# Patient Record
Sex: MALE | Race: WHITE | NOT HISPANIC OR LATINO | Employment: FULL TIME | URBAN - METROPOLITAN AREA
[De-identification: names, ages, dates, MRNs, and addresses within clinical notes are randomized per-mention and may not be internally consistent; named-entity substitution may affect disease eponyms.]

---

## 2018-12-03 ENCOUNTER — OFFICE VISIT - HEALTHEAST (OUTPATIENT)
Dept: UROLOGY | Facility: CLINIC | Age: 58
End: 2018-12-03

## 2018-12-03 DIAGNOSIS — R31.0 GROSS HEMATURIA: ICD-10-CM

## 2018-12-03 DIAGNOSIS — N20.0 CALCULUS OF KIDNEY: ICD-10-CM

## 2018-12-03 DIAGNOSIS — R10.9 LEFT SIDED ABDOMINAL PAIN: ICD-10-CM

## 2018-12-03 LAB
ALBUMIN UR-MCNC: ABNORMAL MG/DL
APPEARANCE UR: ABNORMAL
BILIRUB UR QL STRIP: ABNORMAL
COLOR UR AUTO: ABNORMAL
GLUCOSE UR STRIP-MCNC: NEGATIVE MG/DL
HGB UR QL STRIP: ABNORMAL
KETONES UR STRIP-MCNC: NEGATIVE MG/DL
LEUKOCYTE ESTERASE UR QL STRIP: NEGATIVE
NITRATE UR QL: NEGATIVE
PH UR STRIP: 5 [PH] (ref 5–8)
SP GR UR STRIP: 1.02 (ref 1–1.03)
UROBILINOGEN UR STRIP-ACNC: ABNORMAL

## 2018-12-03 ASSESSMENT — MIFFLIN-ST. JEOR: SCORE: 1400.72

## 2018-12-07 ENCOUNTER — ANESTHESIA - HEALTHEAST (OUTPATIENT)
Dept: SURGERY | Facility: CLINIC | Age: 58
End: 2018-12-07

## 2018-12-07 ENCOUNTER — SURGERY - HEALTHEAST (OUTPATIENT)
Dept: SURGERY | Facility: CLINIC | Age: 58
End: 2018-12-07

## 2018-12-07 ASSESSMENT — MIFFLIN-ST. JEOR: SCORE: 1370.1

## 2018-12-13 ENCOUNTER — AMBULATORY - HEALTHEAST (OUTPATIENT)
Dept: UROLOGY | Facility: CLINIC | Age: 58
End: 2018-12-13

## 2018-12-13 DIAGNOSIS — N20.0 CALCULUS OF KIDNEY: ICD-10-CM

## 2018-12-13 LAB
ALBUMIN UR-MCNC: ABNORMAL MG/DL
APPEARANCE UR: ABNORMAL
BILIRUB UR QL STRIP: ABNORMAL
COLOR UR AUTO: ABNORMAL
GLUCOSE UR STRIP-MCNC: NEGATIVE MG/DL
HGB UR QL STRIP: ABNORMAL
KETONES UR STRIP-MCNC: NEGATIVE MG/DL
LEUKOCYTE ESTERASE UR QL STRIP: ABNORMAL
NITRATE UR QL: NEGATIVE
PH UR STRIP: 5.5 [PH] (ref 5–8)
SP GR UR STRIP: 1.02 (ref 1–1.03)
UROBILINOGEN UR STRIP-ACNC: ABNORMAL

## 2018-12-14 LAB — BACTERIA SPEC CULT: NO GROWTH

## 2019-01-21 ENCOUNTER — OFFICE VISIT - HEALTHEAST (OUTPATIENT)
Dept: UROLOGY | Facility: CLINIC | Age: 59
End: 2019-01-21

## 2019-01-21 ENCOUNTER — HOSPITAL ENCOUNTER (OUTPATIENT)
Dept: CT IMAGING | Facility: CLINIC | Age: 59
Discharge: HOME | End: 2019-01-21
Attending: UROLOGY

## 2019-01-21 DIAGNOSIS — N20.1 CALCULUS OF URETER: ICD-10-CM

## 2019-01-21 DIAGNOSIS — N20.0 CALCULUS OF KIDNEY: ICD-10-CM

## 2019-01-21 LAB
ALBUMIN UR-MCNC: ABNORMAL MG/DL
APPEARANCE UR: ABNORMAL
BILIRUB UR QL STRIP: ABNORMAL
COLOR UR AUTO: ABNORMAL
GLUCOSE UR STRIP-MCNC: NEGATIVE MG/DL
HGB UR QL STRIP: ABNORMAL
KETONES UR STRIP-MCNC: ABNORMAL MG/DL
LEUKOCYTE ESTERASE UR QL STRIP: NEGATIVE
NITRATE UR QL: NEGATIVE
PH UR STRIP: 5.5 [PH] (ref 5–8)
SP GR UR STRIP: 1.02 (ref 1–1.03)
UROBILINOGEN UR STRIP-ACNC: ABNORMAL

## 2019-02-25 ENCOUNTER — COMMUNICATION - HEALTHEAST (OUTPATIENT)
Dept: UROLOGY | Facility: CLINIC | Age: 59
End: 2019-02-25

## 2019-02-25 ENCOUNTER — AMBULATORY - HEALTHEAST (OUTPATIENT)
Dept: LAB | Facility: CLINIC | Age: 59
End: 2019-02-25

## 2019-02-25 DIAGNOSIS — N20.0 CALCULUS OF KIDNEY: ICD-10-CM

## 2019-02-25 LAB
ALBUMIN SERPL-MCNC: 3.9 G/DL (ref 3.5–5)
ANION GAP SERPL CALCULATED.3IONS-SCNC: 6 MMOL/L (ref 5–18)
BUN SERPL-MCNC: 17 MG/DL (ref 8–22)
CALCIUM SERPL-MCNC: 9.1 MG/DL (ref 8.5–10.5)
CALCIUM SERPL-MCNC: 9.1 MG/DL (ref 8.5–10.5)
CALCIUM, IONIZED MEASURED: 1.21 MMOL/L (ref 1.11–1.3)
CHLORIDE BLD-SCNC: 106 MMOL/L (ref 98–107)
CO2 SERPL-SCNC: 28 MMOL/L (ref 22–31)
CREAT SERPL-MCNC: 0.98 MG/DL (ref 0.7–1.3)
CREAT SERPL-MCNC: 1.05 MG/DL (ref 0.7–1.3)
GFR SERPL CREATININE-BSD FRML MDRD: >60 ML/MIN/1.73M2
GFR SERPL CREATININE-BSD FRML MDRD: >60 ML/MIN/1.73M2
GLUCOSE BLD-MCNC: 88 MG/DL (ref 70–125)
ION CA PH 7.4: 1.18 MMOL/L (ref 1.11–1.3)
MAGNESIUM SERPL-MCNC: 2.4 MG/DL (ref 1.8–2.6)
PH: 7.34 (ref 7.35–7.45)
PHOSPHATE SERPL-MCNC: 3.1 MG/DL (ref 2.5–4.5)
PHOSPHATE SERPL-MCNC: 3.1 MG/DL (ref 2.5–4.5)
POTASSIUM BLD-SCNC: 4.1 MMOL/L (ref 3.5–5)
PTH-INTACT SERPL-MCNC: 107 PG/ML (ref 10–86)
SODIUM SERPL-SCNC: 140 MMOL/L (ref 136–145)
URATE SERPL-MCNC: 5 MG/DL (ref 3–8)

## 2019-02-26 LAB
COLLECT DURATION TIME SPEC: 24 HR
CREAT 24H UR-MRATE: 1343 MG/D (ref 800–2100)
CREAT UR-MCNC: 158 MG/DL
MAGNESIUM 24H UR-MRATE: 83 MG/D (ref 12–199)
MAGNESIUM UR-MCNC: 9.8 MG/DL
SPECIMEN VOL ?TM UR: 850 ML

## 2019-02-28 LAB
CALCIUM 24H UR-MRATE: 162 MG/24HR (ref 25–300)
CHLORIDE 24H UR-SRATE: 172 MMOL/24HR (ref 110–250)
CITRATE 24H UR-MCNC: 412 MG/24HR
CREATININE, 24 HR URINE - HISTORICAL: ABNORMAL MG/24HR
MAGNESIUM 24H UR-MRATE: ABNORMAL MG/24 HR (ref 75–150)
OXALATE MG/SPEC: 17.2 MG/24HR (ref 7–44)
PH UR STRIP: 5.5 [PH] (ref 4.5–8)
PHOSPHORUS URINE MG/SPEC: 736.1 MG/24HR
POTASSIUM 24H UR-SCNC: 42 MMOL/24HR (ref 30–90)
SODIUM 24H UR-SRATE: 162 MMOL/24HR (ref 40–217)
SPECIMEN VOL UR: 850 ML
URIC ACID URINE MG/SPEC: 367 MG/24HR (ref 250–750)

## 2019-03-08 ENCOUNTER — AMBULATORY - HEALTHEAST (OUTPATIENT)
Dept: UROLOGY | Facility: CLINIC | Age: 59
End: 2019-03-08

## 2019-03-08 DIAGNOSIS — N20.0 CALCULUS OF KIDNEY: ICD-10-CM

## 2019-03-11 ENCOUNTER — AMBULATORY - HEALTHEAST (OUTPATIENT)
Dept: LAB | Facility: HOSPITAL | Age: 59
End: 2019-03-11

## 2019-03-11 ENCOUNTER — HOSPITAL ENCOUNTER (OUTPATIENT)
Dept: PHYSICAL MEDICINE AND REHAB | Facility: CLINIC | Age: 59
Discharge: HOME | End: 2019-03-11
Attending: NURSE PRACTITIONER

## 2019-03-11 DIAGNOSIS — M43.10 PARS DEFECT WITH SPONDYLOLISTHESIS: ICD-10-CM

## 2019-03-11 DIAGNOSIS — M43.16 SPONDYLOLISTHESIS OF LUMBAR REGION: ICD-10-CM

## 2019-03-11 DIAGNOSIS — N20.0 CALCULUS OF KIDNEY: ICD-10-CM

## 2019-03-12 LAB
CALCIUM 24H UR-MRATE: 106 MG/24HR (ref 25–300)
CHLORIDE 24H UR-SRATE: 94 MMOL/24HR (ref 110–250)
CITRATE 24H UR-MCNC: 302 MG/24HR
COLLECT DURATION TIME SPEC: 24 HR
CREAT 24H UR-MRATE: 1155 MG/D (ref 800–2100)
CREAT UR-MCNC: 210 MG/DL
CREATININE, 24 HR URINE - HISTORICAL: ABNORMAL MG/24HR
MAGNESIUM 24H UR-MRATE: 57 MG/D (ref 12–199)
MAGNESIUM 24H UR-MRATE: ABNORMAL MG/24 HR (ref 75–150)
MAGNESIUM UR-MCNC: 10.3 MG/DL
OXALATE MG/SPEC: 18 MG/24HR (ref 7–44)
PH UR STRIP: 5.5 [PH] (ref 4.5–8)
PHOSPHORUS URINE MG/SPEC: 725.5 MG/24HR
POTASSIUM 24H UR-SCNC: 29 MMOL/24HR (ref 30–90)
SODIUM 24H UR-SRATE: 93 MMOL/24HR (ref 40–217)
SPECIMEN VOL ?TM UR: 550 ML
SPECIMEN VOL UR: 550 ML
URIC ACID URINE MG/SPEC: 262 MG/24HR (ref 250–750)

## 2019-03-18 ENCOUNTER — COMMUNICATION - HEALTHEAST (OUTPATIENT)
Dept: UROLOGY | Facility: CLINIC | Age: 59
End: 2019-03-18

## 2019-03-25 ENCOUNTER — OFFICE VISIT - HEALTHEAST (OUTPATIENT)
Dept: UROLOGY | Facility: CLINIC | Age: 59
End: 2019-03-25

## 2019-03-25 DIAGNOSIS — N20.0 CALCULUS OF KIDNEY: ICD-10-CM

## 2019-03-25 DIAGNOSIS — Z87.442 HISTORY OF KIDNEY STONES: ICD-10-CM

## 2019-03-25 LAB
ALBUMIN UR-MCNC: ABNORMAL MG/DL
APPEARANCE UR: ABNORMAL
BILIRUB UR QL STRIP: ABNORMAL
COLOR UR AUTO: ABNORMAL
GLUCOSE UR STRIP-MCNC: NEGATIVE MG/DL
HGB UR QL STRIP: ABNORMAL
KETONES UR STRIP-MCNC: NEGATIVE MG/DL
LEUKOCYTE ESTERASE UR QL STRIP: NEGATIVE
NITRATE UR QL: NEGATIVE
PH UR STRIP: 5.5 [PH] (ref 5–8)
SP GR UR STRIP: >=1.03 (ref 1–1.03)
UROBILINOGEN UR STRIP-ACNC: ABNORMAL

## 2021-05-27 NOTE — PROGRESS NOTES
Assessment/Plan:        Diagnoses and all orders for this visit:    Calculus of kidney  -     Patient Increasing Fluids Education  -     Patient Stated Goal: Prevent further stones  -     CT Abdomen Pelvis Without Oral Without IV Contrast; Future; Expected date: 12/24/2019    History of kidney stones  -     Urinalysis Macroscopic      Stone Management Plan  [facility] [place] [facility] 12/13/2018 1/21/2019 3/25/2019   Urinary Tract Infection No suspicion of infection No suspicion of infection No suspicion of infection   Renal Colic Well controlled symptoms Asymptomatic at this time Asymptomatic at this time   Renal Failure No suspicion of renal failure No suspicion of renal failure No suspicion of renal failure   Current CT date - 1/21/2019 -   Right sided stones? - No -   R Stone Event No current event No current event No current event   Left sided stones? - Yes -   L Number of ureteral stones - No ureteral stones -   L Number of kidney stones  - 2 -   L GSD of kidney stones - < 2 -   L Hydronephrosis - None -   L Stone Event Established event Resolved event No current event   Diagnosis date - - -   Initial location of primary symptomatic stone - - -   Initial GSD of primary symptomatic stone - - -   Resolved date - 1/21/2019 -   Post-op status Stent Removal <2 mm -   L Current Plan - Observe -   Clear rationale - - -   Observe rationale - Limited stone burden with good prognosis for spontaneous passage -             Subjective:      HPI  Mr. Omi Moe is a 58 y.o.  male returning to the [facility] [place] for follow up of his stone disease.    He has been doing well in the interim. He has had persistent intermittent hematuria since his ureteroscopy ~3 months ago. No pain. Post-op imaging clear.    He has completed stone risk evaluation which demonstrates profound low urine volume. Specimens were collected on a day when he plays hockey and has high losses.    We discussed strategies to  increase hydration.    Will follow with low dose CT 1 year from last imaging.     ROS   Review of systems is negative except for HPI.    Past Medical History:   Diagnosis Date     Kidney stone      Seasonal allergies      Sleep apnea     DOESN'T USE CPAP       Past Surgical History:   Procedure Laterality Date     EXTRACORPOREAL SHOCK WAVE LITHOTRIPSY       KNEE SURGERY Left     arthroscopic     ID CYSTO/URETERO W/LITHOTRIPSY &INDWELL STENT INSRT Left 12/7/2018    Procedure: CYSTOSCOPY, LEFT URETEROSCOPY LASER LITHOTRIPSY STENT INSERTION;  Surgeon: Khoa Diehl MD;  Location[facility];  Service: Urology       No current outpatient medications on file.     No current facility-administered medications for this visit.        No Known Allergies    Social History     Socioeconomic History     Marital status:      Spouse name: Not on file     Number of children: Not on file     Years of education: Not on file     Highest education level: Not on file   Occupational History     Occupation:    Social Needs     Financial resource strain: Not on file     Food insecurity:     Worry: Not on file     Inability: Not on file     Transportation needs:     Medical: Not on file     Non-medical: Not on file   Tobacco Use     Smoking status: Never Smoker     Smokeless tobacco: Never Used   Substance and Sexual Activity     Alcohol use: Yes     Comment: occas     Drug use: No     Sexual activity: Not on file   Lifestyle     Physical activity:     Days per week: Not on file     Minutes per session: Not on file     Stress: Not on file   Relationships     Social connections:     Talks on phone: Not on file     Gets together: Not on file     Attends Muslim service: Not on file     Active member of club or organization: Not on file     Attends meetings of clubs or organizations: Not on file     Relationship status: Not on file     Intimate partner violence:     Fear of current or ex partner: Not on  file     Emotionally abused: Not on file     Physically abused: Not on file     Forced sexual activity: Not on file   Other Topics Concern     Not on file   Social History Narrative     Not on file       Family History   Problem Relation Age of Onset     Diabetes Father      Urolithiasis Son      Clotting disorder Neg Hx      Gout Neg Hx      Cancer Neg Hx      Heart disease Neg Hx      Objective:      Physical Exam  Vitals:    03/25/19 0752   BP: 141/73   Pulse: (!) 55   Temp: 97.4  F (36.3  C)     General - well developed, well nourished, appropriate for age. Appears no distress at this time  Abdomen - slender soft, non-tender, no hepatosplenomegaly, no masses.   - no flank tenderness, no suprapubic tenderness, kidney and bladder non-palpable  MSK - normal spinal curvature. no spinal tenderness. normal gait. muscular strength intact.  Psych - oriented to time, place, and person, normal mood and affect.      Labs   Urinalysis POC (Office):  Nitrite, UA   Date Value Ref Range Status   03/25/2019 Negative Negative Final   01/21/2019 Negative Negative Final   12/13/2018 Negative Negative Final       Lab Urinalysis:  Blood, UA   Date Value Ref Range Status   03/25/2019 Large (!) Negative Final   01/21/2019 Large (!) Negative Final   12/13/2018 Large (!) Negative Final     Nitrite, UA   Date Value Ref Range Status   03/25/2019 Negative Negative Final   01/21/2019 Negative Negative Final   12/13/2018 Negative Negative Final     Leukocytes, UA   Date Value Ref Range Status   03/25/2019 Negative Negative Final   01/21/2019 Negative Negative Final   12/13/2018 Small (!) Negative Final     pH, UA   Date Value Ref Range Status   03/25/2019 5.5 5.0 - 8.0 Final   01/21/2019 5.5 5.0 - 8.0 Final   12/13/2018 5.5 5.0 - 8.0 Final    and Stone prevention labs   Calcium metabolism   Calcium, Ionized Measured   Date Value Ref Range Status   02/25/2019 1.21 1.11 - 1.30 mmol/L Final      PTH   Date Value Ref Range Status   02/25/2019  107 (H) 10 - 86 pg/mL Final     No results found for: [id number]  and 24 hour urine   Calcium, 24H Urine   Date Value Ref Range Status   03/11/2019 106 25 - 300 mg/24hr Final     Comment:       Hypercalciuria >350  Values are for persons with  average daily calcium intake  (600-800 mg/day)   02/25/2019 162 25 - 300 mg/24hr Final     Comment:       Hypercalciuria >350  Values are for persons with  average daily calcium intake  (600-800 mg/day)     Sodium, 24 Hour Urine   Date Value Ref Range Status   03/11/2019 93 40 - 217 mmol/24hr Final   02/25/2019 162 40 - 217 mmol/24hr Final     Citrate, 24 Hour Urine   Date Value Ref Range Status   03/11/2019 302 (L) 413-1,191 mg/24hr Final     Comment:       Reference Ranges are not  established for 0-19 years  or >60 years of age.   02/25/2019 412 (L) 413-1,191 mg/24hr Final     Comment:       Reference Ranges are not  established for 0-19 years  or >60 years of age.     Oxalate, 24 Hour Urine   Date Value Ref Range Status   03/11/2019 18.0 7.0 - 44.0 mg/24hr Final   02/25/2019 17.2 7.0 - 44.0 mg/24hr Final     pH, Urine   Date Value Ref Range Status   03/11/2019 5.5 4.5 - 8.0 Final   02/25/2019 5.5 4.5 - 8.0 Final     Urine Volume   Date Value Ref Range Status   03/11/2019 550 mL Final   02/25/2019 850 mL Final

## 2021-05-27 NOTE — PATIENT INSTRUCTIONS - HE
Patient Stated Goal: Prevent further stones  INCREASING FLUIDS TO DECREASE RISK    Low Urinary Volume:     Kidney stones form because there is not enough water to dissolve the concentrated chemicals and minerals in urine.     Studies have found that people who make kidney stones should drink enough fluids so that they produce at least 2 liters (2 quarts) of urine per day.     Studies have shown that virtually every fluid you might drink decreases the risk of stone formation. Acceptable fluids include milk, coffee, diet and regular sodas, alcoholic beverages, fruit juices and even plain old water.    Increasing fluid intake will increase urine volume and decrease the chance of kidney stone formation.    Fluids:    Anything liquid that fits in a glass counts.     One way to gauge if your body has enough fluids is to check the color of your urine. If the urine is dark and yellow you do not have enough fluids. Urine will be pale yellow to clear if your body has enough fluids.    What we need to survive:    Most fluid we drink is lost through evaporation, more if active in hot humid environments    Body wastes can be cleared in a small amount of urine    All fluid that is consumed in excess of necessary losses  dilutes the urine    Ways to Increase Fluids Daily:    Drink more fluids throughout the day and into the evening. (You may need to awake from sleep to urinate which is a good indication of volume status)    Keep your refrigerator stocked with beverages you like    Drink a variety of fluids - mix it up    Add another beverage to your regular beverage at every meal    Keep a beverage at your desk (work area) and finish it before you leave for breaks, meetings, lunch and end of day    Use larger volume glasses    Whenever you pass a water fountain - stop and drink    Drink a beverage with snacks, if it is a salty snack, double the beverage    Take medication with a full glass of water/fluid    Keep a bottle of  water in your car and drink every 20 miles    Eat high water content fruits (melons, oranges, grapes, etc.)    Flavor water with a squeeze of lemon or lime or Crystal Light     If you do something repetitive throughout the day, link it with having something to drink    When you give your child/children something to drink, you have something to drink also    [facility] can respond to your questions or concerns 24 hours a day at [phone].

## 2021-06-01 ENCOUNTER — RECORDS - HEALTHEAST (OUTPATIENT)
Dept: ADMINISTRATIVE | Facility: CLINIC | Age: 61
End: 2021-06-01

## 2021-06-02 VITALS — WEIGHT: 133 LBS | BODY MASS INDEX: 19.7 KG/M2 | HEIGHT: 69 IN

## 2021-06-02 VITALS — WEIGHT: 136 LBS | BODY MASS INDEX: 20.68 KG/M2

## 2021-06-02 VITALS — HEIGHT: 68 IN | WEIGHT: 128 LBS | BODY MASS INDEX: 19.4 KG/M2

## 2021-06-16 PROBLEM — R31.0 GROSS HEMATURIA: Status: ACTIVE | Noted: 2018-12-03

## 2021-06-16 PROBLEM — R10.9 LEFT SIDED ABDOMINAL PAIN: Status: ACTIVE | Noted: 2018-12-03

## 2021-06-16 PROBLEM — N20.0 CALCULUS OF KIDNEY: Status: ACTIVE | Noted: 2018-12-03

## 2021-06-17 NOTE — PATIENT INSTRUCTIONS - HE
Patient Instructions by Sabiha Mcnally CNP at 3/11/2019  7:47 AM     Author: Sabiha Mcnally CNP Service:  Author Type: Nurse Practitioner    Filed: 3/11/2019  8:04 AM Date of Service: 3/11/2019  7:47 AM Status: Signed    : Sabiha Mcnally CNP (Nurse Practitioner)       ~Please call Nurse Navigation line [phone] with any questions or concerns about your treatment plan, if symptoms worsen and you would like to be seen urgently, or if you have problems controlling bladder and bowel function.  ~Follow Up Appointment time slots with Sabiha Mcnally CNP with [facility], are also available at the [facility] location near [facility] on the first and third THURSDAY afternoons of each month.    You have been told your have a problem with your vertebrae. These are the bones that stack together to make up the spine. Spondylolysis is a defect (crack) in the back part of a vertebra. Spondylolisthesis is the slipping forward of a vertebra. Spondylolisthesis is often due to spondylolysis. But you can have one without the other.           How are Spondylolysis and Spondylolisthesis Treated?  These problems cant be cured, but often stop causing problems in time. In the meantime, your symptoms can be treated.    Medications: To help reduce back pain and swelling, medications may be prescribed. These are usually NSAIDs (nonsteroidal anti-inflammatory drugs). These medications can include ibuprofen and naproxen. They may be over-the-counter or prescription. Your health care provider will tell you what types and dosage are best for your child.     Physical therapy: Stretching and strengthening the muscles around the spine and in the legs can help relieve symptoms due to these conditions. Your doctor may refer you to a physical therapist (PT) for a course of physical therapy and exercises.    Surgery: If spondylolisthesis is severe or cant be treated with nonsurgical means, surgery  may be done. During surgery, the slipping vertebra is fused to the vertebra below it to prevent further movement.

## 2021-06-22 NOTE — PROGRESS NOTES
Patient in office today for cystoscopy left ureteral stent removal procedure.    Patient educated regarding stent removal procedure and possible symptoms after removal.  Patient voiced understanding of information.  Handout given to patient.  Consent form signed.    KSI Timeout    Correct patient?: Yes  Correct site?:  Yes  Correct procedure?:  Yes  Correct laterality?:  Left  Consents verified?:  Yes  Relevant lab results available?:  Yes

## 2021-06-22 NOTE — ANESTHESIA CARE TRANSFER NOTE
Last vitals:   Vitals:    12/07/18 1111   BP: 123/71   Pulse: 68   Resp: 16   Temp: 36.8  C (98.3  F)   SpO2: 100%     Patient's level of consciousness is drowsy  Spontaneous respirations: yes  Maintains airway independently: yes  Dentition unchanged: yes  Oropharynx: oropharynx clear of all foreign objects    QCDR Measures:  ASA# 20 - Surgical Safety Checklist: WHO surgical safety checklist completed prior to induction    PQRS# 430 - Adult PONV Prevention: 4558F - Pt received => 2 anti-emetic agents (different classes) preop & intraop  ASA# 8 - Peds PONV Prevention: NA - Not pediatric patient, not GA or 2 or more risk factors NOT present  PQRS# 424 - Marilyn-op Temp Management: 4559F - At least one body temp DOCUMENTED => 35.5C or 95.9F within required timeframe  PQRS# 426 - PACU Transfer Protocol: - Transfer of care checklist used  ASA# 14 - Acute Post-op Pain: ASA14B - Patient did NOT experience pain >= 7 out of 10

## 2021-06-22 NOTE — PROGRESS NOTES
Assessment/Plan:        Diagnoses and all orders for this visit:    Calculus of kidney  -     Urinalysis Macroscopic  -     Culture, Urine- Future; Future; Expected date: 01/12/2019  -     Culture, Urine- Future  -     ciprofloxacin HCl tablet 250 mg (CIPRO); Take 1 tablet (250 mg total) by mouth once.        -     lidocaine HCl 2 % topical jelly 10 mL (UROJET); Insert 10 mL into the urethra once.        -     Cystoscopy with Stent Removal Education  -     Patient Stated Goal: Prevent further stones  -     CT Abdomen Pelvis Without Oral Without IV Contrast; Future; Expected date: 01/12/2019    Other orders  -     ciprofloxacin HCl (CIPRO) 250 MG tablet;   -     lidocaine HCl (UROJET) 2 % topical jelly;       Stone Management Plan  [facility] [place] Stone [facility] 12/3/2018 12/13/2018   Urinary Tract Infection No suspicion of infection No suspicion of infection   Renal Colic Well controlled symptoms Well controlled symptoms   Renal Failure No suspicion of renal failure No suspicion of renal failure   Current CT date 11/23/2018 -   Right sided stones? No -   R Stone Event No current event No current event   Left sided stones? Yes -   L Number of ureteral stones No ureteral stones -   L Number of kidney stones  4 -   L GSD of kidney stones 4 - 10 -   L Hydronephrosis None -   L Stone Event New event Established event   Diagnosis date 12/3/2018 -   Initial location of primary symptomatic stone Renal -   Initial GSD of primary symptomatic stone 8 -   Post-op status - Stent Removal   L Current Plan Clear -   Clear rationale Symptomatic -             Subjective:      HPI  Mr. Omi Moe is a 58 y.o.  male returning to the [facility] [place] for early postoperative follow up for anticipated stent removal.     He returns status post left ureteroscopic laser lithotripsy for renal stone. He has had no unanticipated post-operative events.    He has had no symptoms suspicious for infection and stent was mildly  symptomatic with typical issues of flank discomfort and lower urinary tract irritation.     Flexible cystoscopy is performed and indwelling stent is removed without incident.    He will follow up in the office in one month with imaging.         ROS   Review of systems is negative except for HPI.    Past Medical History:   Diagnosis Date     Kidney stone      Seasonal allergies      Sleep apnea     DOESN'T USE CPAP       Past Surgical History:   Procedure Laterality Date     EXTRACORPOREAL SHOCK WAVE LITHOTRIPSY       KNEE SURGERY Left     arthroscopic     VT CYSTO/URETERO W/LITHOTRIPSY &INDWELL STENT INSRT Left 12/7/2018    Procedure: CYSTOSCOPY, LEFT URETEROSCOPY LASER LITHOTRIPSY STENT INSERTION;  Surgeon: Khoa Diehl MD;  Location: [facility];  Service: Urology       Current Outpatient Medications   Medication Sig Dispense Refill     oxyCODONE (ROXICODONE) 5 MG immediate release tablet Take 1-2 tablets (5-10 mg total) by mouth every 4 (four) hours as needed for pain. 12 tablet 0     tamsulosin (FLOMAX) 0.4 mg cap Take 1 capsule (0.4 mg total) by mouth daily for 14 days. (Patient taking differently: Take 0.4 mg by mouth daily HASN'T STARTED YET.      ) 14 capsule 1     Current Facility-Administered Medications   Medication Dose Route Frequency Provider Last Rate Last Dose     ciprofloxacin HCl (CIPRO) 250 MG tablet              lidocaine HCl (UROJET) 2 % topical jelly                No Known Allergies    Social History     Socioeconomic History     Marital status:      Spouse name: Not on file     Number of children: Not on file     Years of education: Not on file     Highest education level: Not on file   Social Needs     Financial resource strain: Not on file     Food insecurity - worry: Not on file     Food insecurity - inability: Not on file     Transportation needs - medical: Not on file     Transportation needs - non-medical: Not on file   Occupational History     Occupation: Medical     Tobacco Use     Smoking status: Never Smoker     Smokeless tobacco: Never Used   Substance and Sexual Activity     Alcohol use: Yes     Comment: occas     Drug use: No     Sexual activity: Not on file   Other Topics Concern     Not on file   Social History Narrative     Not on file       Family History   Problem Relation Age of Onset     Diabetes Father      Urolithiasis Son      Clotting disorder Neg Hx      Gout Neg Hx      Cancer Neg Hx      Heart disease Neg Hx      Objective:      Physical Exam  Vitals:    12/13/18 1308   BP: 138/84   Pulse: 63   Temp: 97.7  F (36.5  C)     General - well developed, well nourished, appropriate for age. Appears no distress at this time  Abdomen - slender soft, non-tender, no hepatosplenomegaly, no masses.   - no flank tenderness, no suprapubic tenderness, kidney and bladder non-palpable  MSK - normal spinal curvature. no spinal tenderness. normal gait. muscular strength intact.  Psych - oriented to time, place, and person, normal mood and affect.      Labs   Urinalysis POC (Office):  Nitrite, UA   Date Value Ref Range Status   12/13/2018 Negative Negative Final   12/03/2018 Negative Negative Final       Lab Urinalysis:  Blood, UA   Date Value Ref Range Status   12/13/2018 Large (!) Negative Final   12/03/2018 Large (!) Negative Final     Nitrite, UA   Date Value Ref Range Status   12/13/2018 Negative Negative Final   12/03/2018 Negative Negative Final     Leukocytes, UA   Date Value Ref Range Status   12/13/2018 Small (!) Negative Final   12/03/2018 Negative Negative Final     pH, UA   Date Value Ref Range Status   12/13/2018 5.5 5.0 - 8.0 Final   12/03/2018 5.0 5.0 - 8.0 Final

## 2021-06-22 NOTE — ANESTHESIA POSTPROCEDURE EVALUATION
Patient: Omi Moe  CYSTOSCOPY, LEFT URETEROSCOPY LASER LITHOTRIPSY STENT INSERTION  Anesthesia type: general    Patient location: Phase II Recovery  Last vitals:   Vitals:    12/07/18 1227   BP: 119/66   Pulse: (!) 58   Resp: 18   Temp:    SpO2: 98%     Post vital signs: stable  Level of consciousness: awake and responds to simple questions  Post-anesthesia pain: pain controlled  Post-anesthesia nausea and vomiting: no  Pulmonary: unassisted, return to baseline  Cardiovascular: stable and blood pressure at baseline  Hydration: adequate  Anesthetic events: no    QCDR Measures:  ASA# 11 - Marilyn-op Cardiac Arrest: ASA11B - Patient did NOT experience unanticipated cardiac arrest  ASA# 12 - Marilyn-op Mortality Rate: ASA12B - Patient did NOT die  ASA# 13 - PACU Re-Intubation Rate: ASA13B - Patient did NOT require a new airway mgmt  ASA# 10 - Composite Anes Safety: ASA10A - No serious adverse event    Additional Notes:

## 2021-06-22 NOTE — PROGRESS NOTES
Assessment/Plan:        Diagnoses and all orders for this visit:    Calculus of kidney  -     Urinalysis Macroscopic  -     Place sequential compression device; Standing  -     Insert and maintain IV; Standing  -     sodium chloride bacteriostatic 0.9 % injection 0.1-0.3 mL; Inject 0.1-0.3 mL under the skin as needed (for IV insertion).        -     sodium chloride flush 3 mL (NS); Infuse 3 mL into a venous catheter Line Care.        -     tamsulosin (FLOMAX) 0.4 mg cap; Take 1 capsule (0.4 mg total) by mouth daily for 14 days.  Dispense: 14 capsule; Refill: 1  -     oxyCODONE (ROXICODONE) 5 MG immediate release tablet; Take 1-2 tablets (5-10 mg total) by mouth every 4 (four) hours as needed for pain.  Dispense: 12 tablet; Refill: 0  -     Patient Stated Goal: Know what to expect after surgery  -     Ureteroscopy Education  -     Verify informed consent; Standing  -     Diet NPO; Standing  -     XR Abdomen AP; Standing  -     levoFLOXacin 500 mg/100 mL IVPB 500 mg (LEVAQUIN); Infuse 100 mL (500 mg total) into a venous catheter 60 minutes before surgery or procedure for Prior to Procedure (On Call to OR).          Left sided abdominal pain    Gross hematuria    Other orders  -     fluticasone (FLONASE) 50 mcg/actuation nasal spray; 2 sprays into each nostril.      Stone Management Plan  [facility] 12/3/2018   Urinary Tract Infection No suspicion of infection   Renal Colic Well controlled symptoms   Renal Failure No suspicion of renal failure   Current CT date 11/23/2018   Right sided stones? No   R Stone Event No current event   Left sided stones? Yes   L Number of ureteral stones No ureteral stones   L Number of kidney stones  4   L GSD of kidney stones 4 - 10   L Hydronephrosis None   L Stone Event New event   Diagnosis date 12/3/2018   Initial location of primary symptomatic stone Renal   Initial GSD of primary symptomatic stone 8   L Current Plan Clear   Clear rationale Symptomatic         Subjective:       HPI  Mr. Omi Moe is a 58 y.o.  male presenting to the [facility] [place] self referred for history of nephrolithiasis and acute left flank pain.    He is a first time unidentified composition stone former who has required stone clearance procedures. He has not previously participated in stone risk evaluation. He has no identified modifiable stone risk factors. He has no identified non-modifiable stone risk factors.    In 2009, Omi had a hematuria workup through outside facility, initially presenting with painless gross hematuria. Evaluation was unremarkable for malignancy with negative urine cytology and normal cystoscopy. CT imaging reported a non-obstructing left renal stone, which was treated by ESWL in July 2009 by Dr. Madrigal.     He presents today for definitive treatment of left renal stone burden. He was seen by his previously established Urology office ~ 2 weeks ago for complaint of acute onset mild left abdominal pain and hematuria. He was seen and had a CT scan obtained 11/23/18 which was notable for non-obstructing left renal stones. He was told he would have to wait until after the New Year for surgical clearance, due to availability.    Significant current symptoms include:  mild left abdominal pain and hematuria. He has not required any interventions for pain relief. Pertinent negative current symptoms include:  fever, chills, right flank pain, left flank pain, nausea, vomiting, urinary frequency and dysuria.     Last imaging on file was outside CT scan from 2009 obtained for complaint of painless, gross hematuria. Report stated a solitary 4.5 mm left renal stone.     Outside CT scan from 11/23/18 is personally reviewed and demonstrates 4 left renal stones, with a dominant mid pole 8 mm calculus. No ureteral stones. No hydronephrosis.    Significant labs from presentation include severe hematuria, no pyuria, negative nitrite and no bacteria.    PLAN    57 yo M  with hx of stone disease, previously underwent left ESWL for non-obstructing solitary stone. Acute onset left abdominal pain and hematuria with new, multiple, non-obstructing left renal stones.    Omi would like to proceed with definitive clearance of his stones versus surveillance.    Will proceed with ureterscopic stone clearance this week. Risks and benefits were detailed of ureteroscopic stone clearance including potential issues of urinary or systemic infection, ureteral injury, inaccessible stone, incomplete stone clearance, multiple surgeries, and stent related symptoms of urgency, frequency and hematuria Patient verbalized understanding. Patient agrees with plan as discussed. Preoperative evaluation with primary care is not requested as the referring documentation is adequate.    For symptom control, he was prescribed oxycodone and Flomax. Over the counter symptom control medications of ibuprofen, Dramamine and Tylenol were recommended.    Patient also seen and examined by GATITO Persaud   Review of Systems  A 12 point comprehensive review of systems is negative except for HPI    Past Medical History:   Diagnosis Date     Kidney stone        Past Surgical History:   Procedure Laterality Date     NO PAST SURGERIES         Current Outpatient Medications   Medication Sig Dispense Refill     fluticasone (FLONASE) 50 mcg/actuation nasal spray 2 sprays into each nostril.       oxyCODONE (ROXICODONE) 5 MG immediate release tablet Take 1-2 tablets (5-10 mg total) by mouth every 4 (four) hours as needed for pain. 12 tablet 0     tamsulosin (FLOMAX) 0.4 mg cap Take 1 capsule (0.4 mg total) by mouth daily for 14 days. 14 capsule 1     No current facility-administered medications for this visit.        No Known Allergies    Social History     Socioeconomic History     Marital status:      Spouse name: Not on file     Number of children: Not on file     Years of education: Not on file     Highest  education level: Not on file   Social Needs     Financial resource strain: Not on file     Food insecurity - worry: Not on file     Food insecurity - inability: Not on file     Transportation needs - medical: Not on file     Transportation needs - non-medical: Not on file   Occupational History     Occupation:    Tobacco Use     Smoking status: Never Smoker     Smokeless tobacco: Never Used   Substance and Sexual Activity     Alcohol use: Yes     Comment: occas     Drug use: Not on file     Sexual activity: Not on file   Other Topics Concern     Not on file   Social History Narrative     Not on file       Family History   Problem Relation Age of Onset     Diabetes Father      Urolithiasis Son      Clotting disorder Neg Hx      Gout Neg Hx      Cancer Neg Hx      Heart disease Neg Hx        Objective:      Physical Exam  Vitals:    12/03/18 0818   BP: 113/75   Pulse: (!) 53   Temp: 97.8  F (36.6  C)     General - well developed, well nourished, appropriate for age. Appears no distress at this time   Heart - regular rate and rhythm, no murmur  Respiratory - normal effort, clear to auscultation, good air entry without adventitious noises  Abdomen - slender soft, non-tender, no hepatosplenomegaly, no masses.   - no flank tenderness, no suprapubic tenderness, kidney and bladder non-palpable  MSK - normal spinal curvature. no spinal tenderness. normal gait. muscular strength intact.  Neurology - cranial nerves II-XII grossly intact, normal sensation, no unsteadiness  Skin - intact, no bruising, no gouty tophi  Psych - oriented to time, place, and person, normal mood and affect.    Labs  Urinalysis POC (Office):  Nitrite, UA   Date Value Ref Range Status   12/03/2018 Negative Negative Final       Lab Urinalysis:  Blood, UA   Date Value Ref Range Status   12/03/2018 Large (!) Negative Final     Nitrite, UA   Date Value Ref Range Status   12/03/2018 Negative Negative Final     Leukocytes, UA   Date Value  Ref Range Status   12/03/2018 Negative Negative Final     pH, UA   Date Value Ref Range Status   12/03/2018 5.0 5.0 - 8.0 Final

## 2021-06-22 NOTE — ANESTHESIA PREPROCEDURE EVALUATION
Anesthesia Evaluation      Patient summary reviewed   No history of anesthetic complications     Airway   Mallampati: II  Neck ROM: full   Pulmonary - normal exam    breath sounds clear to auscultation  (+) sleep apnea on no CPAP, ,   (-) shortness of breath, not a smoker                         Cardiovascular - negative ROS  Exercise tolerance: > or = 10 METS  (-) murmur  Rhythm: regular  Rate: normal,    no murmur      Neuro/Psych - negative ROS     Endo/Other - negative ROS      GI/Hepatic/Renal    (+)   chronic renal disease,           Dental - normal exam                        Anesthesia Plan  Planned anesthetic: general LMA    ASA 2   Induction: intravenous   Anesthetic plan and risks discussed with: patient and spouse  Anesthesia plan special considerations: antiemetics,   Post-op plan: routine recovery

## 2021-06-23 NOTE — PATIENT INSTRUCTIONS - HE
Patient Stated Goal: Prevent further stones  Steps for collecting a 24 hour urine specimen    Please follow the directions carefully. All urine voided for a 24-hour period needs to be collected into the jug.  DO NOT change any of your  normal  daily habits when doing this test. Continue to follow your regular diet, intake of fluids, and usual activity level. Pick the most convenient day with your schedule, perhaps on a weekend or a day off.    Start your Diet Log the day before collection and continue on the day of urine collection.  You MUST bring Diet Log with you on follow up visit to discuss results.    One 24hr Urine Collection     Two 24hr Urine Collections  (do not collect on consecutive days)    PLEASE COMPLETE THE 2nd JUG WITHIN 1-2 WEEKS FROM THE 1st JUG    STEP 1  Empty your bladder completely into the toilet. This will be your start time. Write your full legal name, start date and time on the jug label.  Collection start and stop times need to match exactly!  For example:  6 am to 6 am.    STEP 2  The next time you urinate, empty your bladder directly into the jug or collection hat and pour urine into the jug.  Screw the lid back onto the jug.  Do not spill!    STEP 3  Place the jug in the refrigerator or a cooler with ice during the collection period.  Failure to keep it cool could cause inaccurate test results. DO NOT Freeze.    STEP 4  Continue collecting all urine into the jug for the rest of the day, for the full 24 hours.  DO NOT stop early or go over 24 hours!    STEP 5  Exactly 24 hours from start of collection, write your full legal name, stop date and time on the jug label.   Collection start and stop times need to match exactly!  For example:  6 am to 6 am.  Failure to label correctly will result in recollection of urine specimen.    STEP 6  Return each jug within 24 hours after final urination.     STEP 7  Drop off jug locations:   [facility]: Mon-Fri 7am-7pm - Closed on  weekends  [facility]: Mon-Fri 7am-5pm - Closed on Sunday  [facility]: Mon-Fri 7am-6:30pm - Closed on weekends    STEP 8  Please call [facility] after return of your final jug to schedule your follow-up visit. [phone]

## 2021-06-23 NOTE — PROGRESS NOTES
Assessment/Plan:        Diagnoses and all orders for this visit:    Calculus of kidney  -     Renal Function Profile; Future; Expected date: 01/21/2019  -     Magnesium; Future; Expected date: 01/21/2019  -     Uric Acid; Future; Expected date: 01/21/2019  -     Parathyroid Hormone Intact with Minerals; Future; Expected date: 01/21/2019  -     Calcium, Ionized, Measured; Future; Expected date: 01/21/2019  -     Patient Stated Goal: Prevent further stones  -     24 Hour Urine Collection Steps Education    Calculus of ureter  -     Urinalysis Macroscopic      Stone Management Plan  [facility] Stone Management 12/3/2018 12/13/2018 1/21/2019   Urinary Tract Infection No suspicion of infection No suspicion of infection No suspicion of infection   Renal Colic Well controlled symptoms Well controlled symptoms Asymptomatic at this time   Renal Failure No suspicion of renal failure No suspicion of renal failure No suspicion of renal failure   Current CT date 11/23/2018 - 1/21/2019   Right sided stones? No - No   R Stone Event No current event No current event No current event   Left sided stones? Yes - Yes   L Number of ureteral stones No ureteral stones - No ureteral stones   L Number of kidney stones  4 - 2   L GSD of kidney stones 4 - 10 - < 2   L Hydronephrosis None - None   L Stone Event New event Established event Resolved event   Diagnosis date 12/3/2018 - -   Initial location of primary symptomatic stone Renal - -   Initial GSD of primary symptomatic stone 8 - -   Resolved date - - 1/21/2019   Post-op status - Stent Removal <2 mm   L Current Plan Clear - Observe   Clear rationale Symptomatic - -   Observe rationale - - Limited stone burden with good prognosis for spontaneous passage             Subjective:      HPI  Mr. Omi Moe is a 58 y.o.  male returning to the [facility] [place] for late postoperative follow-up.     He returns status post Left ureteroscopic laser lithotripsy for renal  stone. He has had no unanticipated events.     He is asymptomatic at present. He denies symptoms of fever, chills, flank pain, nausea, vomiting, urinary frequency and dysuria. He has had persistent mild gross hematuria. No clots.    New CT scan was personally reviewed and demonstrates largest residual fragment is <2 mm in the left renal upper pole with no hydronephrosis.     Stone composition was 100% calcium oxalate.     He is at risk for ongoing active stone disease and will initiate stone risk evaluation. Serum stone risk chemistries including parathyroid hormone and ionized calcium were drawn today. Two 24 hour urine collections and dietary journal will be obtained at earliest covenience..    I discussed his CT with radiologist and there is no obvious renal mass or other source for bleeding on this non-contrast study. Will allow a little more time to settle as the treated stone was embedded within the kidney and may require healing. Will re-image with contract if bleeding persists.    An unassociated finding on CT is a grade II spondylolisthesis at L5-S1. No symptoms. Suggested he a) get a primary MD and b) review findings with PCP.     ROS   Review of systems is negative except for HPI.    Past Medical History:   Diagnosis Date     Kidney stone      Seasonal allergies      Sleep apnea     DOESN'T USE CPAP       Past Surgical History:   Procedure Laterality Date     EXTRACORPOREAL SHOCK WAVE LITHOTRIPSY       KNEE SURGERY Left     arthroscopic     [place] CYSTO/URETERO W/LITHOTRIPSY &INDWELL STENT INSRT Left 12/7/2018    Procedure: CYSTOSCOPY, LEFT URETEROSCOPY LASER LITHOTRIPSY STENT INSERTION;  Surgeon: Khoa Diehl MD;  Location: [facility];  Service: Urology       No current outpatient medications on file.     No current facility-administered medications for this visit.        No Known Allergies    Social History     Socioeconomic History     Marital status:      Spouse name: Not on file      Number of children: Not on file     Years of education: Not on file     Highest education level: Not on file   Social Needs     Financial resource strain: Not on file     Food insecurity - worry: Not on file     Food insecurity - inability: Not on file     Transportation needs - medical: Not on file     Transportation needs - non-medical: Not on file   Occupational History     Occupation:    Tobacco Use     Smoking status: Never Smoker     Smokeless tobacco: Never Used   Substance and Sexual Activity     Alcohol use: Yes     Comment: occas     Drug use: No     Sexual activity: Not on file   Other Topics Concern     Not on file   Social History Narrative     Not on file       Family History   Problem Relation Age of Onset     Diabetes Father      Urolithiasis Son      Clotting disorder Neg Hx      Gout Neg Hx      Cancer Neg Hx      Heart disease Neg Hx      Objective:      Physical Exam  Vitals:    01/21/19 0803   BP: 133/81   Pulse: (!) 59   Temp: 97.6  F (36.4  C)     General - well developed, well nourished, appropriate for age. Appears no distress at this time  Abdomen - slender soft, non-tender, no hepatosplenomegaly, no masses.   - no flank tenderness, no suprapubic tenderness, kidney and bladder non-palpable  MSK - normal spinal curvature. no spinal tenderness. normal gait. muscular strength intact.  Psych - oriented to time, place, and person, normal mood and affect.      Labs   Urinalysis POC (Office):  Nitrite, UA   Date Value Ref Range Status   01/21/2019 Negative Negative Final   12/13/2018 Negative Negative Final   12/03/2018 Negative Negative Final       Lab Urinalysis:  Blood, UA   Date Value Ref Range Status   01/21/2019 Large (!) Negative Final   12/13/2018 Large (!) Negative Final   12/03/2018 Large (!) Negative Final     Nitrite, UA   Date Value Ref Range Status   01/21/2019 Negative Negative Final   12/13/2018 Negative Negative Final   12/03/2018 Negative Negative Final      Leukocytes, UA   Date Value Ref Range Status   01/21/2019 Negative Negative Final   12/13/2018 Small (!) Negative Final   12/03/2018 Negative Negative Final     pH, UA   Date Value Ref Range Status   01/21/2019 5.5 5.0 - 8.0 Final   12/13/2018 5.5 5.0 - 8.0 Final   12/03/2018 5.0 5.0 - 8.0 Final

## 2021-06-24 NOTE — PROGRESS NOTES
ASSESSMENT: Omi Moe is a 58 y.o. male who presents for consultation at the request of HE PCP, with a past medical history significant for kidney calculus who presents today for new patient evaluation of:    -Grade 2 L5-S1 spondylolisthesis with pars defect incidentally noted on abdominal CT scan, with no current or past symptoms.  Patient is neurologically intact on exam.    OSBALDO Score: 0    WHO 5: 20     Diagnoses and all orders for this visit:    Spondylolisthesis of lumbar region    Pars defect with spondylolisthesis      PLAN:  Reviewed spine anatomy and disease process. Discussed diagnosis and treatment options with the patient today. A shared decision making model was used.  The patient's values and choices were respected. The following represents what was discussed and decided upon by the provider and the patient.      -DIAGNOSTIC TESTS:  Images were personally reviewed and interpreted and explained to patient today using spine model.   --Due to no current symptoms, no need for further imaging, discussed that we could obtain further lumbar spine imaging if symptoms arise down the road.  --L5-S1 does show grade 2 spondylolisthesis with significant degenerative disc changes, question partial to complete autofusion at this level.    -PHYSICAL THERAPY: Did advise patient to continue with core strengthening which he is very active with soccer other sports that he enjoys with no symptoms.  We could do formalized physical therapy down the road if he is having further symptoms but at this point as he is active advised core strengthening type of exercises such as yoga and vidya chi otherwise.  Did advise patient to limit any lifting over 50 pounds on a regular basis, and always use safe lifting mechanisms.  Discussed the importance of core strengthening, ROM, stretching exercises with the patient and how each of these entities is important in decreasing pain.  Explained to the patient that the purpose of  physical therapy is to teach the patient a home exercise program.  These exercises need to be performed every day in order to decrease pain and prevent future occurrences of pain.      -PATIENT EDUCATION:  35 minutes of total visit time was spent face to face with the patient today, greater than 50% of total time spent with patient was spent on counseling, education, and coordinating care.   -10 minutes spent outside of visit time, non-face-to-face time, reviewing chart.    -FOLLOW-UP:   Follow-up as needed.    Advised patient to call the [facility] if symptoms worsen or you have problems controlling bladder and bowel function.   ______________________________________________________________________    SUBJECTIVE:  HPI:  Omi Moe  Is a 58 y.o. male who presents today for new patient evaluation of incidentally noted L5-S1 grade 2 spondylolisthesis on abdominal CT scan after a kidney stone, and patient was told to follow-up with the spine center to understand this more so.  Patient is very active and therefore wants to know if he has any limitations to avoid this from causing any problems in the future, is also understanding generally what this means for him.    However he denies any current or history of back issues, denies any prior trauma or injury, denies numbness or tingling sensations, denies lower extremity pain or weakness, denies recent trips or falls or balance changes, denies bowel or bladder dysfunction.    No prior treatment to date as patient did not have any prior symptoms.    No Known Allergies    Past Medical History:   Diagnosis Date     Kidney stone      Seasonal allergies      Sleep apnea     DOESN'T USE CPAP        Patient Active Problem List   Diagnosis     Calculus of kidney     Left sided abdominal pain     Gross hematuria       Past Surgical History:   Procedure Laterality Date     EXTRACORPOREAL SHOCK WAVE LITHOTRIPSY       KNEE SURGERY Left     arthroscopic     [place] CYSTO/URETERO  W/LITHOTRIPSY &INDWELL STENT INSRT Left 12/7/2018    Procedure: CYSTOSCOPY, LEFT URETEROSCOPY LASER LITHOTRIPSY STENT INSERTION;  Surgeon: Khoa Diehl MD;  Location: [facility];  Service: Urology       Family History   Problem Relation Age of Onset     Diabetes Father      Urolithiasis Son      Clotting disorder Neg Hx      Gout Neg Hx      Cancer Neg Hx      Heart disease Neg Hx        Reviewed past medical, surgical, and family history with patient found on new patient intake packet located in EMR Media tab.     SOCIAL HX: Patient is an , patient is .  Patient denies smoking/tobacco use, does drink beer on occasions but denies being a heavy drinker, denies recreational drug use.    ROS: Specifically negative for bowel/bladder dysfunction, balance changes, headache, dizziness, foot drop, fevers, chills, appetite changes, nausea/vomiting, unexplained weight loss. Otherwise 13 systems reviewed are negative. Please see the patient's intake questionnaire from today for details.    OBJECTIVE:  /79 (Patient Site: Right Arm, Patient Position: Sitting)   Pulse 64   Wt 136 lb (61.7 kg)   SpO2 99%   BMI 20.68 kg/m      PHYSICAL EXAMINATION:    --CONSTITUTIONAL:  Vital signs as above.  No acute distress.  The patient is well nourished and well groomed.  --PSYCHIATRIC:  Appropriate mood and affect. The patient is awake, alert, oriented to person, place, time and answering questions appropriately with clear speech.    --SKIN:  Skin over the face, bilateral lower extremities, and posterior torso is clean, dry, intact without rashes.    --RESPIRATORY: Normal rhythm and effort. No abnormal accessory muscle breathing patterns noted.   --STANDING EXAMINATION:  Normal lumbar lordosis noted, no lateral shift.  --MUSCULOSKELETAL: Lumbar spine inspection reveals no evidence of deformity. Range of motion is not limited in lumbar flexion, extension, lateral rotation.   --GROSS MOTOR: Gait is  non-antalgic. Easily arises from a seated position.  --LOWER EXTREMITY MOTOR TESTING:  Plantar flexion left 5/5, right 5/5   Dorsiflexion left 5/5, right 5/5   Great toe MTP extension left 5/5, right 5/5  Knee flexion left 5/5, right 5/5  Knee extension left 5/5, right 5/5   Hip flexion left 5/5, right 5/5  Hip abduction left 5/5, right 5/5  Hip adduction left 5/5, right 5/5   --NEUROLOGICAL:  2/4 patellar, medial hamstring, and achilles reflexes bilaterally.  Sensation to light touch is intact in the bilateral L4, L5, and S1 dermatomes.   --VASCULAR: Bilateral lower extremities are warm.  There is no pitting edema of the bilateral lower extremities.    RESULTS: Prior medical records from [facility] 12/3/2018 to current and care everywhere were reviewed today.    Imaging: Lumbar spine Imaging was personally reviewed and interpreted today. The images were shown to the patient and the findings were explained using a spine model.      CT ABDOMEN PELVIS WO ORAL WO IV CONTRAST  LOCATION: [facility]  DATE/TIME: 1/21/2019   INDICATION: Status post stent lithotripsy with left renal stone removal.  COMPARISON: 12/07/2018  TECHNIQUE: Helical thin-section CT scan of the abdomen and pelvis was performed without oral or IV contrast. Multiplanar reformats were obtained. Dose reduction techniques were used.  FINDINGS:   LUNG BASES: Unremarkable.  ABDOMEN: Noncontrast images of the liver, spleen, pancreas, gallbladder, adrenal glands and right kidney are normal. There are 2 punctate calculi in the upper pole calyx of the left kidney measuring 1 to 2 mm size. No obstructive changes. Left renal stent has been removed. Aorta is normal caliber.  PELVIS: No distal ureteral or bladder calculi. No free fluid. Prostate is not enlarged. Redundant colon.  MUSCULOSKELETAL: Bilateral spondylolysis at L5 with grade 2 spondylolisthesis of L5 relative to S1. No suspicious.  CONCLUSION:   1.  There are 2 punctate, nonobstructive calculi  in the upper pole calyx of the left kidney. Interval stent removal. No stone burden on the right.  2.  Grade 2 spondylolisthesis of L5 relative to S1 due to bilateral spondylolysis.

## 2021-12-20 DIAGNOSIS — N20.0 CALCULUS OF KIDNEY: Primary | ICD-10-CM

## 2021-12-27 ENCOUNTER — HOSPITAL ENCOUNTER (OUTPATIENT)
Dept: CT IMAGING | Facility: CLINIC | Age: 61
Discharge: HOME | End: 2021-12-27
Attending: PHYSICIAN ASSISTANT | Admitting: PHYSICIAN ASSISTANT
Payer: COMMERCIAL

## 2021-12-27 DIAGNOSIS — N20.0 CALCULUS OF KIDNEY: ICD-10-CM

## 2021-12-27 PROCEDURE — 74176 CT ABD & PELVIS W/O CONTRAST: CPT

## 2021-12-28 ENCOUNTER — VIRTUAL VISIT (OUTPATIENT)
Dept: UROLOGY | Facility: CLINIC | Age: 61
End: 2021-12-28
Payer: COMMERCIAL

## 2021-12-28 DIAGNOSIS — N20.0 CALCULUS OF KIDNEY: ICD-10-CM

## 2021-12-28 DIAGNOSIS — R31.0 GROSS HEMATURIA: Primary | ICD-10-CM

## 2021-12-28 PROCEDURE — 99214 OFFICE O/P EST MOD 30 MIN: CPT | Mod: 95 | Performed by: UROLOGY

## 2021-12-28 ASSESSMENT — PAIN SCALES - GENERAL: PAINLEVEL: NO PAIN (0)

## 2021-12-28 NOTE — PROGRESS NOTES
Assessment/Plan:    Assessment & Plan   Omi was seen today for long term.    Diagnoses and all orders for this visit:    Calculus of kidney    Gross hematuria        Stone Management Plan  Stone Management 12/28/2021   Urinary Tract Infection No suspicion of infection   Renal Colic Asymptomatic at this time   Renal Failure No suspicion of renal failure   Current CT date 12/27/2021   Right sided stones? Yes   R Number of ureteral stones No ureteral stones   R Number of kidney stones  1   R GSD of kidney stones 2 - 4   R Stone Event No current event   Left sided stones? Yes   L Number of ureteral stones No ureteral stones   L Number of kidney stones  3   L GSD of kidney stones 2 - 4   L Hydronephrosis None   L Stone Event No current event             PLAN    Video call duration: 10 minutes  15 minutes spent on the date of the encounter doing chart review, history and exam, documentation and further activities per the note    SAURABH BAILEY MD  [facility]    HPI  Mr. Omi Moe is a 61 year old  male who is being evaluated via a billable video visit by [facility] [place] for follow up of his stone disease.    He has been generally been doing well. Had a couple episodes of gross hematuria recently. Thought he might be passing a stone. Non-smoker. No pain currently.    CT demonstrates stable bilateral stone burden essentially unchanged over the last two years.    Need to take the hematuria seriously in this 61 year old man. Will setup contrast CT and cytology then cystoscopy.    ROS   Review of systems is negative except for HPI.    Past Medical History:   Diagnosis Date     COVID-19 11/2021     Kidney stone        Past Surgical History:   Procedure Laterality Date     EXTRACORPOREAL SHOCK WAVE LITHOTRIPSY       KNEE SURGERY Left     arthroscopic     [place] CYSTO/URETERO W/LITHOTRIPSY &INDWELL STENT INSRT Left 12/7/2018    Procedure:  CYSTOSCOPY, LEFT URETEROSCOPY LASER LITHOTRIPSY STENT INSERTION;  Surgeon: Khoa Diehl MD;  Location: [facility] OR;  Service: Urology       No current outpatient medications on file.       No Known Allergies    Social History     Socioeconomic History     Marital status:      Spouse name: Not on file     Number of children: Not on file     Years of education: Not on file     Highest education level: Not on file   Occupational History     Not on file   Tobacco Use     Smoking status: Never Smoker     Smokeless tobacco: Never Used   Substance and Sexual Activity     Alcohol use: Yes     Comment: Alcoholic Drinks/day: occas     Drug use: No     Sexual activity: Not on file   Other Topics Concern     Parent/sibling w/ CABG, MI or angioplasty before 65F 55M? Not Asked   Social History Narrative     Not on file     Social Determinants of Health     Financial Resource Strain: Not on file   Food Insecurity: Not on file   Transportation Needs: Not on file   Physical Activity: Not on file   Stress: Not on file   Social Connections: Not on file   Intimate Partner Violence: Not on file   Housing Stability: Not on file       Family History   Problem Relation Age of Onset     Diabetes Father      Urolithiasis Son      Clotting Disorder No family hx of      Gout No family hx of      Cancer No family hx of      Heart Disease No family hx of        Objective:     Appears AAO x 3  No vitals obtained due to virtual visit    Labs   Most Recent 3 CBC's:No lab results found.  Most Recent 3 BMP's:Recent Labs   Lab Test 02/25/19  0731      POTASSIUM 4.1   CHLORIDE 106   CO2 28   BUN 17   CR 0.98  1.05   ANIONGAP 6   LIANG 9.1  9.1   GLC 88     Urinalysis POC (Office):  Nitrite Urine   Date Value Ref Range Status   03/25/2019 Negative Negative Final   01/21/2019 Negative Negative Final   12/13/2018 Negative Negative Final       Lab Urinalysis:  Nitrite Urine   Date Value Ref Range Status   03/25/2019 Negative  Negative Final   01/21/2019 Negative Negative Final   12/13/2018 Negative Negative Final    and Acute Labs   Urine Culture    Culture   Date Value Ref Range Status   12/13/2018 No Growth  Final

## 2021-12-28 NOTE — PROGRESS NOTES
Patient is roomed via telephone for a virtual visit.  Patient confirmed  is in the [facility] at the time of this appointment.  Patient understands that this virtual visit is billable and agree to proceed with appointment.

## 2021-12-28 NOTE — Clinical Note
CT urogram and cytology sometime relatively soon; office cystoscopy which he will want to schedule after a hockey tournament in January

## 2022-01-10 ENCOUNTER — OFFICE VISIT (OUTPATIENT)
Dept: UROLOGY | Facility: CLINIC | Age: 62
End: 2022-01-10
Payer: COMMERCIAL

## 2022-01-10 ENCOUNTER — LAB (OUTPATIENT)
Dept: LAB | Facility: CLINIC | Age: 62
End: 2022-01-10

## 2022-01-10 VITALS — SYSTOLIC BLOOD PRESSURE: 157 MMHG | DIASTOLIC BLOOD PRESSURE: 98 MMHG | HEART RATE: 61 BPM | TEMPERATURE: 96.9 F

## 2022-01-10 DIAGNOSIS — N20.0 CALCULUS OF KIDNEY: ICD-10-CM

## 2022-01-10 DIAGNOSIS — R31.0 GROSS HEMATURIA: ICD-10-CM

## 2022-01-10 DIAGNOSIS — R31.0 GROSS HEMATURIA: Primary | ICD-10-CM

## 2022-01-10 PROCEDURE — 99213 OFFICE O/P EST LOW 20 MIN: CPT | Mod: 25 | Performed by: UROLOGY

## 2022-01-10 PROCEDURE — 52000 CYSTOURETHROSCOPY: CPT | Performed by: UROLOGY

## 2022-01-10 PROCEDURE — 88112 CYTOPATH CELL ENHANCE TECH: CPT | Performed by: UROLOGY

## 2022-01-10 RX ORDER — CIPROFLOXACIN 500 MG/1
500 TABLET, FILM COATED ORAL ONCE
Qty: 1 TABLET | Refills: 0 | Status: SHIPPED | OUTPATIENT
Start: 2022-01-10 | End: 2022-01-10

## 2022-01-10 ASSESSMENT — PAIN SCALES - GENERAL: PAINLEVEL: NO PAIN (0)

## 2022-01-10 NOTE — PROGRESS NOTES
Patient educated regarding cystoscopy procedure and possible symptoms after completion.  Patient voiced understanding of information.  Handout given to patient.  Consent form signed.  Leana Castle RN

## 2022-01-10 NOTE — PROGRESS NOTES
Assessment/Plan:    Assessment & Plan   Omi was seen today for follow up.    Diagnoses and all orders for this visit:    Gross hematuria  -     CT Urogram wo & w Contrast; Future    Calculus of kidney  -     ciprofloxacin (CIPRO) 500 MG tablet; Take 1 tablet (500 mg) by mouth once for 1 dose  -     CT Urogram wo & w Contrast; Future        Stone Management Plan  Stone Management 12/28/2021 1/10/2022   Urinary Tract Infection No suspicion of infection No suspicion of infection   Renal Colic Asymptomatic at this time Asymptomatic at this time   Renal Failure No suspicion of renal failure No suspicion of renal failure   Current CT date 12/27/2021 -   Right sided stones? Yes -   R Number of ureteral stones No ureteral stones -   R Number of kidney stones  1 -   R GSD of kidney stones 2 - 4 -   R Stone Event No current event No current event   Left sided stones? Yes -   L Number of ureteral stones No ureteral stones -   L Number of kidney stones  3 -   L GSD of kidney stones 2 - 4 -   L Hydronephrosis None -   L Stone Event No current event No current event             SAURABH BAILEY MD  [facility]    HPI  Mr. Omi Moe is a 61 year old  male who returns to [facility] [place] for follow up of his gross hematuria.     He has been well in the interim. No further hematuria. No flank or abdominal pain.    Flexible cystoscopy is performed and demonstrates small normal prostate, no evidence of bladder mucosal concerns.    Will await results of cytology collected today and CT urogram.    ROS   Review of systems is negative except for HPI.    Past Medical History:   Diagnosis Date     COVID-19 11/2021     Kidney stone        Past Surgical History:   Procedure Laterality Date     EXTRACORPOREAL SHOCK WAVE LITHOTRIPSY       KNEE SURGERY Left     arthroscopic     [place] CYSTO/URETERO W/LITHOTRIPSY &INDWELL STENT INSRT Left 12/7/2018    Procedure:  CYSTOSCOPY, LEFT URETEROSCOPY LASER LITHOTRIPSY STENT INSERTION;  Surgeon: Khoa Diehl MD;  Location: [facility] OR;  Service: Urology       Current Outpatient Medications   Medication Sig Dispense Refill     ciprofloxacin (CIPRO) 500 MG tablet Take 1 tablet (500 mg) by mouth once for 1 dose 1 tablet 0       No Known Allergies    Social History     Socioeconomic History     Marital status:      Spouse name: Not on file     Number of children: Not on file     Years of education: Not on file     Highest education level: Not on file   Occupational History     Not on file   Tobacco Use     Smoking status: Never Smoker     Smokeless tobacco: Never Used   Substance and Sexual Activity     Alcohol use: Yes     Comment: Alcoholic Drinks/day: occas     Drug use: No     Sexual activity: Not on file   Other Topics Concern     Parent/sibling w/ CABG, MI or angioplasty before 65F 55M? Not Asked   Social History Narrative     Not on file     Social Determinants of Health     Financial Resource Strain: Not on file   Food Insecurity: Not on file   Transportation Needs: Not on file   Physical Activity: Not on file   Stress: Not on file   Social Connections: Not on file   Intimate Partner Violence: Not on file   Housing Stability: Not on file       Family History   Problem Relation Age of Onset     Diabetes Father      Urolithiasis Son      Clotting Disorder No family hx of      Gout No family hx of      Cancer No family hx of      Heart Disease No family hx of        Objective:     Constitutional:   awake, alert, cooperative, no apparent distress, and appears stated age   Abdomen:   Soft, scaphoid, non-tender          Labs   Most Recent 3 CBC's:No lab results found.  Most Recent 3 BMP's:Recent Labs   Lab Test 02/25/19  0731      POTASSIUM 4.1   CHLORIDE 106   CO2 28   BUN 17   CR 0.98  1.05   ANIONGAP 6   LIANG 9.1  9.1   GLC 88     Most Recent Urinalysis:Recent Labs   Lab Test 03/25/19  0753   COLOR Brown*    APPEARANCE Cloudy*   URINEGLC Negative   URINEBILI Small*   URINEKETONE Negative   SG >=1.030   UBLD Large*   URINEPH 5.5   PROTEIN 100 mg/dL*   UROBILINOGEN 0.2 E.U./dL   NITRITE Negative   LEUKEST Negative

## 2022-01-11 ENCOUNTER — TELEPHONE (OUTPATIENT)
Dept: UROLOGY | Facility: CLINIC | Age: 62
End: 2022-01-11
Payer: COMMERCIAL

## 2022-01-11 LAB
PATH REPORT.COMMENTS IMP SPEC: NORMAL
PATH REPORT.FINAL DX SPEC: NORMAL
PATH REPORT.GROSS SPEC: NORMAL
PATH REPORT.MICROSCOPIC SPEC OTHER STN: NORMAL
PATH REPORT.RELEVANT HX SPEC: NORMAL

## 2022-01-13 ENCOUNTER — HOSPITAL ENCOUNTER (OUTPATIENT)
Dept: CT IMAGING | Facility: CLINIC | Age: 62
Discharge: HOME | End: 2022-01-13
Attending: UROLOGY | Admitting: UROLOGY
Payer: COMMERCIAL

## 2022-01-13 DIAGNOSIS — R31.0 GROSS HEMATURIA: ICD-10-CM

## 2022-01-13 DIAGNOSIS — N20.0 CALCULUS OF KIDNEY: ICD-10-CM

## 2022-01-13 PROCEDURE — 74178 CT ABD&PLV WO CNTR FLWD CNTR: CPT

## 2022-01-13 PROCEDURE — 250N000011 HC RX IP 250 OP 636: Performed by: UROLOGY

## 2022-01-13 RX ORDER — IOPAMIDOL 755 MG/ML
100 INJECTION, SOLUTION INTRAVASCULAR ONCE
Status: COMPLETED | OUTPATIENT
Start: 2022-01-13 | End: 2022-01-13

## 2022-01-13 RX ADMIN — IOPAMIDOL 100 ML: 755 INJECTION, SOLUTION INTRAVENOUS at 18:05

## 2022-01-14 ENCOUNTER — TELEPHONE (OUTPATIENT)
Dept: UROLOGY | Facility: CLINIC | Age: 62
End: 2022-01-14
Payer: COMMERCIAL

## 2022-01-14 NOTE — CONFIDENTIAL NOTE
Spoke with patient and updated him on his lab and radiology testing, all negative.  Patient is still having blood in his urine, no pain.    Leana Castle RN    Patient advised that the blood in his urine may be from the stones per provider.  It could indicate that his stones are getting ready to pass.  Patient to f/u in one year with a CT, call in interim with symptoms.  Patient in agreement with plan.  Leana Castle RN